# Patient Record
(demographics unavailable — no encounter records)

---

## 2017-04-14 NOTE — EDM.PDOC
ED HPI Allergic Reaction





- General


Chief Complaint: Allergic Reaction


Stated Complaint: PT HAS ALLERGIC REACTION, 22WKS PREGNANT


Time Seen by Provider: 17 20:03





- History of Present Illness


INITIAL COMMENTS - FREE TEXT/NARRATIVE: 





HISTORY AND PHYSICAL:





History of present illness:


The patient is a 19-year-old female was a  one para zero who is 22 weeks 

pregnant and follows with an OB in [place] and presents with symptoms of an 

allergic reaction after eating garlic at dinner at 7 PM, one hour ago. 

According to the patient she has not had any vaginal bleeding fevers chills 

coughing vomiting or diarrhea and was in her usual state of good health when 

they went to the restaurant and ate dinner where there was a lot of garlic. The 

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 

amounts" without having a reaction. The meal that she had had more garlic than 

she had anticipated. She presents with feeling somewhat short of breath and 

feeling overall internally agitated but she is not having any itching or rash 

which is classic for her reaction. She's not having  any facial swelling 

difficulty speaking or swallowing. She says that what she is experiencing is 

her symptomatology for an allergic reaction but it is just less severe than 

usual. She did take Benadryl 50 mg by mouth prior to coming here. She is 

complaining of some intermittent abdominal pain "Ravalli Nicole" that she says 

she gets periodically every week but is experiencing that currently. The baby 

is moving.





Review of systems: 


As per history of present illness and below otherwise all systems reviewed and 

negative.





Past medical history: 


As per history of present illness and as reviewed below otherwise 

noncontributory.





Surgical history: 


As per history of present illness and as reviewed below otherwise 

noncontributory.





Social history: 


No reported history of drug or alcohol abuse.





Family history: 


As per history of present illness and as reviewed below otherwise 

noncontributory.





Physical exam:


General: A well-developed well-nourished thin female who is nontoxic and 

speaking clearly in the ED without breathlessness. Her medicines have been 

noted by me.


HEENT: Atraumatic, normocephalic, pupils reactive, negative for conjunctival 

pallor or scleral icterus, mucous membranes moist, throat clear, neck supple, 

nontender, trachea midline. There is no lip tongue or oropharyngeal swelling 

and no facial swelling is appreciated


Lungs: Clear to auscultation, breath sounds equal bilaterally, chest nontender. 

No sensory muscle use work of breathing stridor or wheezing


Heart: S1S2, regular, negative for clicks, rubs, or JVD.


Abdomen: Soft, nondistended, nontender. Gravid uterus which is nontender 

Negative for masses or hepatosplenomegaly.


Skin: No evidence of any rashes or urticaria or lesions and turgor is normal


Genitourinary: Deferred.


Rectal: Deferred.


Extremities: Atraumatic, negative for cords or calf pain. Neurovascular 

unremarkable.


Neuro: Awake, alert, oriented. Cranial nerves II through XII unremarkable. 

Cerebellum unremarkable. Motor and sensory unremarkable throughout. Exam 

nonfocal.





Diagnostics:


[]





Therapeutics:


Patient took Benadryl prior to arrival, IV fluids Pepcid Solu-Medrol





Labor and delivery were contacted by our nurse supervisor at ; they state 

that once we're done treating her to send her up to labor and delivery for 

evaluation of her abdominal cramping





: Patient is feeling improved so I will transfer the patient to labor and 

delivery. I have advised taking Benadryl every 6 hours for the next 24 hours 

but I do not feel that her symptoms are significant enough for her classic 

presentation to put her on home prednisone. She is comfortable with this care 

plan. Advised on reasons to return to the ER if needed.





Impression: 


Allergic reaction mild stable ; second trimester pregnancy with episodic 

abdominal pain to go to labor and delivery for evaluation





Definitive disposition and diagnosis as appropriate pending reevaluation and 

review of above.





- Related Data


Allergies/ADRs: 


 Allergies











Allergy/AdvReac Type Severity Reaction Status Date / Time


 


acetaminophen [From Vicodin] Allergy  Nausea Verified 17 19:57


 


budesonide Allergy  Numbness Verified 17 19:57


 


garlic Allergy  Shortness Verified 17 19:57





   of Breath  


 


hydrocodone [From Vicodin] Allergy  Nausea Verified 17 19:57


 


Sulfa (Sulfonamide Allergy  Rash Verified 17 19:57





Antibiotics)     











Home Meds: 


 Home Meds





FLUoxetine HCl [Fluoxetine] 20 mg PO DAILY 17 [History]


Multivitamin [Multi-Vitamin Daily] 1 tab PO DAILY 17 [History]


Ondansetron HCl [Zofran] 4 mg PO ASDIRECTED 17 [History]











ED ROS ALLERGIC REACTION





- Review of Systems


Review Of Systems: ROS reveals no pertinent complaints other than HPI.





ED EXAM GENERAL NO PERIP PULSE





- Physical Exam


Exam: See Below (See dictation)





Course





- Vital Signs


Last Recorded V/S: 


 Last Vital Signs











Temp  36.6 C   17 19:59


 


Pulse  71   17 19:59


 


Resp  18   17 19:59


 


BP  119/64   17 19:59


 


Pulse Ox  100   17 19:59














- Orders/Labs/Meds


Orders: 


 Active Orders 24 hr











 Category Date Time Status


 


 Sodium Chloride 0.9% [Normal Saline] 1,000 ml Med  17 20:08 Active





 IV STAT   


 


 Sodium Chloride 0.9% [Saline Flush] Med  17 20:08 Active





 10 ml FLUSH ASDIRECTED PRN   


 


 Sodium Chloride 0.9% [Saline Flush] Med  17 20:08 Active





 2.5 ml FLUSH ASDIRECTED PRN   


 


 Saline Lock Insert [OM.PC] Stat Oth  17 20:08 Ordered








 Medication Orders





Sodium Chloride (Normal Saline)  1,000 mls @ 999 mls/hr IV STAT ONE


   Stop: 17 21:08


   Last Admin: 17 20:28  Dose: 999 mls/hr


Sodium Chloride (Saline Flush)  10 ml FLUSH ASDIRECTED PRN


   PRN Reason: Keep Vein Open


Sodium Chloride (Saline Flush)  2.5 ml FLUSH ASDIRECTED PRN


   PRN Reason: Keep Vein Open








Meds: 


Medications











Generic Name Dose Route Start Last Admin





  Trade Name Freq  PRN Reason Stop Dose Admin


 


Sodium Chloride  1,000 mls @ 999 mls/hr  17 20:08  17 20:28





  Normal Saline  IV  17 21:08  999 mls/hr





  STAT ONE   Administration


 


Sodium Chloride  10 ml  17 20:08  





  Saline Flush  FLUSH   





  ASDIRECTED PRN   





  Keep Vein Open   


 


Sodium Chloride  2.5 ml  17 20:08  





  Saline Flush  FLUSH   





  ASDIRECTED PRN   





  Keep Vein Open   














Discontinued Medications














Generic Name Dose Route Start Last Admin





  Trade Name Freq  PRN Reason Stop Dose Admin


 


Famotidine  20 mg  17 20:08  17 20:30





  Pepcid  IVPUSH  17 20:09  20 mg





  ONETIME ONE   Administration


 


Methylprednisolone Sodium Succinate  125 mg  17 20:08  17 20:29





  Solu-Medrol  IVPUSH  17 20:09  125 mg





  ONETIME ONE   Administration














Departure





- Departure


Time of Disposition: 21:00


Disposition: DC/Tfer to Other 70


Condition: good


Clinical Impression: 


Allergic reaction


Qualifiers:


 Encounter type: initial encounter Qualified Code(s): T78.40XA - Allergy, 

unspecified, initial encounter





Abdominal pain during pregnancy


Qualifiers:


 Trimester: second trimester Qualified Code(s): O26.892 - Other specified 

pregnancy related conditions, second trimester





Referrals: 


PCP,None [Primary Care Provider] - 





- My Orders


Last 24 Hours: 


My Active Orders





17 20:08


Sodium Chloride 0.9% [Normal Saline] 1,000 ml IV STAT 


Sodium Chloride 0.9% [Saline Flush]   10 ml FLUSH ASDIRECTED PRN 


Sodium Chloride 0.9% [Saline Flush]   2.5 ml FLUSH ASDIRECTED PRN 


Saline Lock Insert [OM.PC] Stat 














- Assessment/Plan


Last 24 Hours: 


My Active Orders





17 20:08


Sodium Chloride 0.9% [Normal Saline] 1,000 ml IV STAT 


Sodium Chloride 0.9% [Saline Flush]   10 ml FLUSH ASDIRECTED PRN 


Sodium Chloride 0.9% [Saline Flush]   2.5 ml FLUSH ASDIRECTED PRN 


Saline Lock Insert [OM.PC] Stat

## 2017-11-14 NOTE — EDM.PDOC
ED HPI GENERAL MEDICAL PROBLEM





- General


Chief Complaint: Upper Extremity Injury/Pain


Stated Complaint: PT HURT LT SHOULDER


Time Seen by Provider: 11/14/17 19:58


Source of Information: Reports: Patient


History Limitations: Reports: No Limitations





- History of Present Illness


INITIAL COMMENTS - FREE TEXT/NARRATIVE: 


History of present illness:


[20-year-old female comes in complaining of left shoulder pain. Patient 

indicates that she was horsing around with her boyfriend and tripped over her 

dog and landed full at an acute angle causing severe pain in her left shoulder 

she apparently tried ice and heat to no avail so she came in to be evaluated.]





Review of systems: 


As per history of present illness and below otherwise all systems reviewed and 

negative.





Past medical history: 


As per history of present illness and as reviewed below otherwise 

noncontributory.





Surgical history: 


As per history of present illness and as reviewed below otherwise 

noncontributory.





Social history: 


No reported history of drug or alcohol abuse.





Family history: 


As per history of present illness and as reviewed below otherwise 

noncontributory.





Physical exam:


HEENT: Atraumatic, normocephalic, pupils reactive, negative for conjunctival 

pallor or scleral icterus, mucous membranes moist, throat clear, neck supple, 

nontender, trachea midline.


Lungs: Clear to auscultation, breath sounds equal bilaterally, chest nontender.


Heart: S1S2, regular, negative for clicks, rubs, or JVD.


Abdomen: Soft, nondistended, nontender. Negative for masses or 

hepatosplenomegaly. Negative for costovertebral tenderness.


Pelvis: Stable nontender.


Genitourinary: Deferred.


Rectal: Deferred.


Extremities: Atraumatic, negative for cords or calf pain. Significant amount of 

guarding with left shoulder keeping arm tight to the side


Neuro: Awake, alert, oriented. Cranial nerves II through XII unremarkable. 

Cerebellum unremarkable. Motor and sensory unremarkable throughout. Exam 

nonfocal.








X-ray is negative for shoulder or rib bony abnormality





Diagnostics:


[X-ray of the left shoulder and left ribs]





Therapeutics:


[Toradol, Norflex]





Impression: 


[Contusion]





Plan:


[Over-the-counter and sent]





Definitive disposition and diagnosis as appropriate pending reevaluation and 

review of above.











- Related Data


 Allergies











Allergy/AdvReac Type Severity Reaction Status Date / Time


 


acetaminophen [From Vicodin] Allergy  Nausea Verified 11/14/17 20:59


 


budesonide Allergy  Numbness Verified 11/14/17 20:59


 


garlic Allergy  Shortness Verified 11/14/17 20:59





   of Breath  


 


hydrocodone [From Vicodin] Allergy  Nausea Verified 11/14/17 20:59


 


Sulfa (Sulfonamide Allergy  Rash Verified 11/14/17 20:59





Antibiotics)     











Home Meds: 


 Home Meds





Multivitamin [Multi-Vitamin Daily] 1 tab PO DAILY 04/14/17 [History]











Past Medical History


HEENT History: Reports: None


Cardiovascular History: Reports: None


Respiratory History: Reports: None


Gastrointestinal History: Reports: Other (See Below)


Other Gastrointestinal History: chron's


Genitourinary History: Reports: None


OB/GYN History: Reports: Pregnancy


Musculoskeletal History: Reports: None


Neurological History: Reports: None


Psychiatric History: Reports: Anxiety, OCD


Endocrine/Metabolic History: Reports: None


Hematologic History: Reports: None


Oncologic (Cancer) History: Reports: None


Dermatologic History: Reports: None





- Infectious Disease History


Infectious Disease History: Reports: None





Social & Family History





- Family History


Family Medical History: Noncontributory





- Tobacco Use


Smoking Status *Q: Never Smoker





- Caffeine Use


Caffeine Use: Reports: Soda





- Recreational Drug Use


Recreational Drug Use: No





Review of Systems





- Review of Systems


Review Of Systems: See Below (See history of present illness)





ED EXAM, GENERAL





- Physical Exam


Exam: See Below (History of present illness)





Course





- Orders/Labs/Meds


Orders: 


 Active Orders 24 hr











 Category Date Time Status


 


 Ribs 2V w Chest Lt [CR] Stat Exams  11/14/17 20:34 Taken


 


 Shoulder Comp Lt [CR] Stat Exams  11/14/17 19:58 Taken














Departure





- Departure


Time of Disposition: 21:03


Disposition: Home, Self-Care 01


Condition: Good


Clinical Impression: 


 Contusion








- Discharge Information


Referrals: 


PCP,None [Primary Care Provider] - 


Forms:  ED Department Discharge


Additional Instructions: 


The following information is given to patients seen in the emergency department 

who are being discharged to home. This information is to outline your options 

for follow-up care. We provide all patients seen in our emergency department 

with a follow-up referral.





The need for follow-up, as well as the timing and circumstances, are variable 

depending upon the specifics of your emergency department visit.





If you don't have a primary care physician on staff, we will provide you with a 

referral. We always advise you to contact your personal physician following an 

emergency department visit to inform them of the circumstance of the visit and 

for follow-up with them and/or the need for any referrals to a consulting 

specialist.





The emergency department will also refer you to a specialist when appropriate. 

This referral assures that you have the opportunity for follow-up care with a 

specialist. All of these measure are taken in an effort to provide you with 

optimal care, which includes your follow-up.





Under all circumstances we always encourage you to contact your private 

physician who remains a resource for coordinating your care. When calling for 

follow-up care, please make the office aware that this follow-up is from your 

recent emergency room visit. If for any reason you are refused follow-up, 

please contact the [facility] Emergency 

Department at [phone] and asked to speak to the emergency department 

charge nurse.





Your shoulder and ribs are negative for fracture at this time the pain you're 

having would be consistent then with a contusion








You may take over-the-counter pain medicine such as ibuprofen or Aleve as well 

as alternating ice and heat for 20 minutes a time to help you with your 

discomfort











Follow-up with primary care provider one to 2 days











Return to ED as needed as discussed





- My Orders


Last 24 Hours: 


My Active Orders





11/14/17 19:58


Shoulder Comp Lt [CR] Stat 














- Assessment/Plan


Last 24 Hours: 


My Active Orders





11/14/17 19:58


Shoulder Comp Lt [CR] Stat

## 2017-11-15 NOTE — CR
EXAM DATE: 17



PATIENT'S AGE: 20





Patient: PERICO KEENAN



Facility: [place], [place]

Patient ID: [id number]

Site Patient ID: [id number].

Site Accession #: [id number]

: 1997

Study: XRay Shoulder [id number]/14/2017 8:52:14 PM

Ordering Physician: Doctor Temp



Final Report: 

INDICATION: fall



TECHNIQUE:

Three views of the left shoulder



COMPARISON:

None 



FINDINGS:

Bones: No fractures or bone lesions. 

Joint spaces: Unremarkable. 

Soft tissues: Unremarkable. 



IMPRESSION:

No acute bony abnormality



Dictated by Angelito Briones MD @ 2017 8:57:34 PM





Dictated by: Angelito Briones MD @ 2017 20:57:40

(Electronic Signature)





Report Signed by Proxy.
[facility]

## 2017-11-15 NOTE — CR
EXAM DATE: 17



PATIENT'S AGE: 20





Patient: PERICO KEENAN



Facility: [place], [place]

Patient ID: [id number]

Site Patient ID: [id number].

Site Accession #: [id number].

: 1997

Study: XRay Chest ribs w/ chest [id number]/14/2017 8:51:58 PM

Ordering Physician: Doctor Temp



Final Report: 

INDICATION: fall



TECHNIQUE:

Chest 1 view and left rib series 



COMPARISON:

None 



FINDINGS:

Cardiovascular and mediastinum: Heart size and vasculature are normal in 
caliber and appearance. Mediastinum is within normal limits. 

Lungs and pleural space: No focal consolidation. No sign of pleural effusion. 
No pneumothorax. 

Bones and soft tissues: No significant findings. 



IMPRESSION:

No acute cardiopulmonary disease or left-sided rib abnormality



Dictated by Angelito Briones MD @ 2017 8:56:50 PM





Dictated by: Angelito Briones MD @ 2017 20:57:00

(Electronic Signature)



Report Signed by Proxy.
ALEXA

## 2018-01-23 NOTE — EDM.PDOC
ED HPI GENERAL MEDICAL PROBLEM





- General


Chief Complaint: Abdominal Pain


Stated Complaint: RIGHT SIDE ABDOMINAL AND LOWER BACK PAIN


Time Seen by Provider: 01/23/18 15:24


Source of Information: Reports: Patient


History Limitations: Reports: No Limitations





- History of Present Illness


INITIAL COMMENTS - FREE TEXT/NARRATIVE: 





HISTORY AND PHYSICAL:


[]20-year-old  female presenting with concerns over right lower 

quadrant pain





History of Present Illness:


[]Patient's had this pain for at least a month off and on she has an IUD


Her nurse midwife probably a cyst


Patient relates history of Crohn's disease treated with steroids in the past


There is history grandmother with ovarian cancer





Review of Systems:


As per history of present illness and below otherwise all 


systems reviewed and negative.  





Past medical history:


As per history of present illness and as reviewed below


otherwise noncontributory.





Surgical history:


As per history of present illness and as reviewed below


otherwise noncontributory.





Social history:


No reported history of drug or alcohol abuse.





Family history:


As per history of present illness and as reviewed below


otherwise noncontributory.





Physical exam:


Alert and oriented female answering questions in full sentences without any 

shortness of breath denies need for pain medication at this time rates her pain 

4/10.


HEENT: Atraumatic, normocehpalic, pupils reactive, negative for conjunctival 

pallor or scleral icterus, mucous membranes moist, throat clear, neck supple, 

nontender, trachea midline.  


Lungs: Clear to auscultation, breath sounds equal bilaterally, chest non 

tender.  


Heart: S1S2, regular, negative for clicks, rubs, or JVD.


Abdomen: Soft, nondistended, mildly tender. No rebound and no guarding. 

Negative for masses or hepatossplenmegaly. Negative for costovertebral 

tenderness.


Pelvis: Stable nontender.


Genitourinary: Deferred.


Rectal: Deferred


Extremities: Atraumatic, negative for cords or calf pain.  


Neurovascular unremarkable.


Neuro:  Awake, alert, oriented.  Cranial nerves II through XII


unremarkable.  Cerebellum unremarkable.  Motor and sensory unremarkable 

throughout.  Exam nonfocal.  


Test results are unremarkable with suggests that this is a inflammation with 

her Crohn's disease





Diagnostics:


[cbc


cmp


ua


us abd/pelvis non-ob]





Therapeutics:


[]





Impression:


[Abdominal pain likely Crohn's disease]





Plan:


[]Discharged to home


Follow up with your primary care provider


Adrenal dose pack by prescription





Definitive disposition and diagnosis as appropriate pending


reevaluation and review of above.  





Onset: Gradual


Duration: Week(s):


Location: Reports: Abdomen


  ** Right lower abdomen


Pain Score (Numeric/FACES): 3





- Related Data


 Allergies











Allergy/AdvReac Type Severity Reaction Status Date / Time


 


acetaminophen [From Vicodin] Allergy  Nausea Verified 01/23/18 15:12


 


budesonide Allergy  Numbness Verified 01/23/18 15:12


 


garlic Allergy  Shortness Verified 01/23/18 15:12





   of Breath  


 


hydrocodone [From Vicodin] Allergy  Nausea Verified 01/23/18 15:12


 


Sulfa (Sulfonamide Allergy  Rash Verified 01/23/18 15:12





Antibiotics)     


 


pine trees Allergy  Hives Uncoded 01/23/18 15:12











Home Meds: 


 Home Meds





buPROPion [Wellbutrin] 25 mg PO DAILY 01/23/18 [History]











Past Medical History


HEENT History: Reports: None


Cardiovascular History: Reports: None


Respiratory History: Reports: Asthma


Gastrointestinal History: Reports: Other (See Below)


Other Gastrointestinal History: chron's


Genitourinary History: Reports: None


OB/GYN History: Reports: Pregnancy


Musculoskeletal History: Reports: None


Neurological History: Reports: None


Psychiatric History: Reports: Anxiety, Depression, OCD


Endocrine/Metabolic History: Reports: None


Hematologic History: Reports: None


Oncologic (Cancer) History: Reports: None


Dermatologic History: Reports: None





- Infectious Disease History


Infectious Disease History: Reports: None





Social & Family History





- Family History


Family Medical History: Noncontributory


Other Dermatologic Family History: ovarian CA





- Tobacco Use


Smoking Status *Q: Never Smoker


Years of Tobacco use: 4


Packs/Tins Daily: 1


Second Hand Smoke Exposure: No





- Caffeine Use


Caffeine Use: Reports: Soda, Tea





- Recreational Drug Use


Recreational Drug Use: Yes


Recreational Drug Type: Reports: Marijuana/Hashish


Recreational Drug Use Frequency: Weekly





ED ROS GENERAL





- Review of Systems


Review Of Systems: ROS reveals no pertinent complaints other than HPI.





ED EXAM, RENAL/





- Physical Exam


Exam: See Below (see dictation)





Course





- Vital Signs


Last Recorded V/S: 


 Last Vital Signs











Temp  36.6 C   01/23/18 15:09


 


Pulse  110 H  01/23/18 15:09


 


Resp  18   01/23/18 15:09


 


BP  119/68   01/23/18 15:09


 


Pulse Ox  100   01/23/18 15:09














- Orders/Labs/Meds


Orders: 


 Active Orders 24 hr











 Category Date Time Status


 


 Sodium Chloride 0.9% [Saline Flush] Med  01/23/18 15:22 Active





 10 ml FLUSH ASDIRECTED PRN   


 


 Sodium Chloride 0.9% [Saline Flush] Med  01/23/18 15:22 Active





 2.5 ml FLUSH ASDIRECTED PRN   


 


 Saline Lock Insert [OM.PC] Stat Oth  01/23/18 15:22 Ordered








 Medication Orders





Sodium Chloride (Saline Flush)  10 ml FLUSH ASDIRECTED PRN


   PRN Reason: Keep Vein Open


Sodium Chloride (Saline Flush)  2.5 ml FLUSH ASDIRECTED PRN


   PRN Reason: Keep Vein Open








Labs: 


 Laboratory Tests











  01/23/18 01/23/18 01/23/18 Range/Units





  15:35 15:35 15:35 


 


WBC  9.60    (4.0-11.0)  K/uL


 


RBC  4.14 L    (4.30-5.90)  M/uL


 


Hgb  12.3    (12.0-16.0)  g/dL


 


Hct  37.0    (36.0-46.0)  %


 


MCV  89.4    (80.0-98.0)  fL


 


MCH  29.7    (27.0-32.0)  pg


 


MCHC  33.2    (31.0-37.0)  g/dL


 


RDW Std Deviation  43.6    (28.0-62.0)  fl


 


RDW Coeff of Fatoumata  13    (11.0-15.0)  %


 


Plt Count  339    (150-400)  K/uL


 


MPV  11.60    (7.40-12.00)  fL


 


Neut % (Auto)  63.6    (48.0-80.0)  %


 


Lymph % (Auto)  26.0    (16.0-40.0)  %


 


Mono % (Auto)  6.8    (0.0-15.0)  %


 


Eos % (Auto)  3.0    (0.0-7.0)  %


 


Baso % (Auto)  0.6    (0.0-1.5)  %


 


Neut # (Auto)  6.1 H    (1.4-5.7)  K/uL


 


Lymph # (Auto)  2.5 H    (0.6-2.4)  K/uL


 


Mono # (Auto)  0.7    (0.0-0.8)  K/uL


 


Eos # (Auto)  0.3    (0.0-0.7)  K/uL


 


Baso # (Auto)  0.1    (0.0-0.1)  K/uL


 


Nucleated RBC %  0.0    /100WBC


 


Nucleated RBCs #  0    K/uL


 


Sodium   142   (136-146)  mmol/L


 


Potassium   3.7   (3.5-5.1)  mmol/L


 


Chloride   107   ()  mmol/L


 


Carbon Dioxide   25   (21-31)  mmol/L


 


BUN   7   (6.0-23.0)  mg/dL


 


Creatinine   0.7   (0.6-1.5)  mg/dL


 


Est Cr Clr Drug Dosing   106.05   mL/min


 


Estimated GFR (MDRD)   > 60.0   ml/min


 


Glucose   92   ()  mg/dL


 


Calcium   9.0   (8.8-10.8)  mg/dL


 


Total Bilirubin   0.4   (0.1-1.5)  mg/dL


 


AST   12   (5-40)  IU/L


 


ALT   9   (8-54)  IU/L


 


Alkaline Phosphatase   91   ()  


 


Total Protein   6.9   (6.0-8.0)  g/dL


 


Albumin   4.0   (3.5-5.0)  g/dL


 


Globulin   2.9   (2.0-3.5)  g/dL


 


Albumin/Globulin Ratio   1.4   (1.3-2.8)  


 


Urine Color    YELLOW  


 


Urine Appearance    CLEAR  


 


Urine pH    8.0  (5.0-8.0)  


 


Ur Specific Gravity    1.010  (1.001-1.035)  


 


Urine Protein    NEGATIVE  (NEGATIVE)  mg/dL


 


Urine Glucose (UA)    NEGATIVE  (NEGATIVE)  mg/dL


 


Urine Ketones    NEGATIVE  (NEGATIVE)  mg/dL


 


Urine Occult Blood    NEGATIVE  (NEGATIVE)  


 


Urine Nitrite    NEGATIVE  (NEGATIVE)  


 


Urine Bilirubin    NEGATIVE  (NEGATIVE)  


 


Urine Urobilinogen    0.2  (<2.0)  EU/dL


 


Ur Leukocyte Esterase    NEGATIVE  (NEGATIVE)  


 


Urine RBC    0-1  (0-2/HPF)  


 


Urine WBC    0-1  (0-5/HPF)  


 


Ur Epithelial Cells    OCCASIONAL  (NONE-FEW)  


 


Urine Bacteria    RARE  (NEGATIVE)  











Meds: 


Medications











Generic Name Dose Route Start Last Admin





  Trade Name Freq  PRN Reason Stop Dose Admin


 


Sodium Chloride  10 ml  01/23/18 15:22  





  Saline Flush  FLUSH   





  ASDIRECTED PRN   





  Keep Vein Open   


 


Sodium Chloride  2.5 ml  01/23/18 15:22  





  Saline Flush  FLUSH   





  ASDIRECTED PRN   





  Keep Vein Open   














Departure





- Departure


Time of Disposition: 16:50


Disposition: Home, Self-Care 01


Condition: Good


Clinical Impression: 


Abdominal pain


Qualifiers:


 Abdominal location: right lower quadrant Qualified Code(s): R10.31 - Right 

lower quadrant pain








- Discharge Information


Referrals: 


PCP,None [Primary Care Provider] - 


Forms:  ED Department Discharge





- My Orders


Last 24 Hours: 


My Active Orders





01/23/18 15:22


Sodium Chloride 0.9% [Saline Flush]   10 ml FLUSH ASDIRECTED PRN 


Sodium Chloride 0.9% [Saline Flush]   2.5 ml FLUSH ASDIRECTED PRN 


Saline Lock Insert [OM.PC] Stat 














- Assessment/Plan


Last 24 Hours: 


My Active Orders





01/23/18 15:22


Sodium Chloride 0.9% [Saline Flush]   10 ml FLUSH ASDIRECTED PRN 


Sodium Chloride 0.9% [Saline Flush]   2.5 ml FLUSH ASDIRECTED PRN 


Saline Lock Insert [OM.PC] Stat

## 2018-01-23 NOTE — US
EXAMINATION: Transabdominal pelvic ultrasound

 

HISTORY: Pain

 

COMPARISON: None

 

TECHNIQUE: Grayscale, color Doppler, and spectral Doppler imaging obtained transabdominally.

 

FINDINGS: The uterus is normal in size, contour, and echogenicity with an IUD noted. Endometrial stri
pe otherwise measures 5 mm. Trace physiologic free fluid.

 

Both the left and right ovaries are normal size, contour, and echogenicity demonstrating normal color
 and spectral Doppler flow. Simple 2.9 cm left ovarian cyst is noted.

 

IMPRESSION: 

1. Grossly unremarkable pelvic ultrasound.

2. IUD noted within the uterus in place.

## 2018-02-17 NOTE — EDM.PDOC
ED HPI GENERAL MEDICAL PROBLEM





- General


Chief Complaint: Abdominal Pain


Stated Complaint: CHROMES DISEASE FLARE UP


Time Seen by Provider: 02/17/18 21:32





- History of Present Illness


INITIAL COMMENTS - FREE TEXT/NARRATIVE: 


HISTORY AND PHYSICAL:





History of present illness:


Patient's 20-year-old female history of Crohn's disease presents with a concern 

of abdominal pain she states this is consistent with her prior Crohn's 

exacerbations that she has been relatively asymptomatic during her recent past 

pregnancy. She's had nausea and vomiting with abdominal pain she denies fever 

chills diarrhea or other complaints at this point. She denies urinary symptoms 

vaginal discharge or irregular bleeding





Review of systems: 


As per history of present illness and below otherwise all systems reviewed and 

negative.





Past medical history: 


As per history of present illness and as reviewed below otherwise 

noncontributory.





Surgical history: 


As per history of present illness and as reviewed below otherwise 

noncontributory.





Social history: 


No reported history of drug or alcohol abuse.





Family history: 


As per history of present illness and as reviewed below otherwise 

noncontributory.





Physical exam:


HEENT: Atraumatic, normocephalic, pupils reactive, negative for conjunctival 

pallor or scleral icterus, mucous membranes moist, throat clear, neck supple, 

nontender, trachea midline.


Lungs: Clear to auscultation, breath sounds equal bilaterally, chest nontender.


Heart: S1S2, regular, negative for clicks, rubs, or JVD.


Abdomen: Soft, nondistended, no localized tenderness. Negative for masses or 

hepatosplenomegaly. Negative for costovertebral tenderness.


Pelvis: Stable nontender.


Genitourinary: Deferred.


Rectal: Deferred.


Extremities: Atraumatic, negative for cords or calf pain. Neurovascular 

unremarkable.


Neuro: Awake, alert, oriented. Cranial nerves II through XII unremarkable. 

Cerebellum unremarkable. Motor and sensory unremarkable throughout. Exam 

nonfocal.





Diagnostics:


CBC CMP UA hCG CRP CT abdomen and pelvis





Therapeutics:


Normal saline 1 L bolus Zofran 4 mg IV when necessary





Impression: 


#1 abdominal pain #2 history of Crohn's disease





Definitive disposition and diagnosis as appropriate pending reevaluation and 

review of above.





  ** Abdominal


Pain Score (Numeric/FACES): 5





- Related Data


 Allergies











Allergy/AdvReac Type Severity Reaction Status Date / Time


 


acetaminophen [From Vicodin] Allergy  Nausea Verified 01/23/18 15:12


 


budesonide Allergy  Numbness Verified 01/23/18 15:12


 


garlic Allergy  Shortness Verified 01/23/18 15:12





   of Breath  


 


hydrocodone [From Vicodin] Allergy  Nausea Verified 01/23/18 15:12


 


Sulfa (Sulfonamide Allergy  Rash Verified 01/23/18 15:12





Antibiotics)     


 


pine trees Allergy  Hives Uncoded 01/23/18 15:12











Home Meds: 


 Home Meds





buPROPion [Wellbutrin] 25 mg PO DAILY 01/23/18 [History]











Past Medical History


HEENT History: Reports: None


Cardiovascular History: Reports: None


Respiratory History: Reports: Asthma


Gastrointestinal History: Reports: Other (See Below)


Other Gastrointestinal History: chron's


Genitourinary History: Reports: None


OB/GYN History: Reports: Pregnancy


Musculoskeletal History: Reports: None


Neurological History: Reports: None


Psychiatric History: Reports: Anxiety, Depression, OCD


Endocrine/Metabolic History: Reports: None


Hematologic History: Reports: None


Oncologic (Cancer) History: Reports: None


Dermatologic History: Reports: None





- Infectious Disease History


Infectious Disease History: Reports: None





Social & Family History





- Family History


Family Medical History: Noncontributory


Other Dermatologic Family History: ovarian CA





- Tobacco Use


Smoking Status *Q: Never Smoker


Years of Tobacco use: 4


Packs/Tins Daily: 1


Second Hand Smoke Exposure: No





- Caffeine Use


Caffeine Use: Reports: Soda, Tea





- Recreational Drug Use


Recreational Drug Use: Yes


Recreational Drug Type: Reports: Marijuana/Hashish


Recreational Drug Use Frequency: Weekly





ED ROS GENERAL





- Review of Systems


Review Of Systems: ROS reveals no pertinent complaints other than HPI.





ED EXAM, GENERAL





- Physical Exam


Exam: See Below (See dictation)





Course





- Vital Signs


Last Recorded V/S: 


 Last Vital Signs











Temp  37.4 C   02/17/18 21:24


 


Pulse  105 H  02/17/18 21:24


 


Resp  16   02/17/18 21:24


 


BP  128/81   02/17/18 21:24


 


Pulse Ox  100   02/17/18 21:24














- Orders/Labs/Meds


Orders: 


 Active Orders 24 hr











 Category Date Time Status


 


 Abdomen Pelvis wo Cont [CT] Stat Exams  02/17/18 21:37 Taken











Labs: 


 Laboratory Tests











  02/17/18 02/17/18 02/17/18 Range/Units





  21:40 21:40 22:10 


 


WBC  14.36 H    (4.0-11.0)  K/uL


 


RBC  4.40    (4.30-5.90)  M/uL


 


Hgb  13.0    (12.0-16.0)  g/dL


 


Hct  38.5    (36.0-46.0)  %


 


MCV  87.5    (80.0-98.0)  fL


 


MCH  29.5    (27.0-32.0)  pg


 


MCHC  33.8    (31.0-37.0)  g/dL


 


RDW Std Deviation  42.4    (28.0-62.0)  fl


 


RDW Coeff of Fatoumata  13    (11.0-15.0)  %


 


Plt Count  465 H    (150-400)  K/uL


 


MPV  11.10    (7.40-12.00)  fL


 


Neut % (Auto)  70.6    (48.0-80.0)  %


 


Lymph % (Auto)  19.0    (16.0-40.0)  %


 


Mono % (Auto)  8.1    (0.0-15.0)  %


 


Eos % (Auto)  2.0    (0.0-7.0)  %


 


Baso % (Auto)  0.3    (0.0-1.5)  %


 


Neut # (Auto)  10.1 H    (1.4-5.7)  K/uL


 


Lymph # (Auto)  2.7 H    (0.6-2.4)  K/uL


 


Mono # (Auto)  1.2 H    (0.0-0.8)  K/uL


 


Eos # (Auto)  0.3    (0.0-0.7)  K/uL


 


Baso # (Auto)  0.1    (0.0-0.1)  K/uL


 


Nucleated RBC %  0.0    /100WBC


 


Nucleated RBCs #  0    K/uL


 


Sodium   140   (136-146)  mmol/L


 


Potassium   3.8   (3.5-5.1)  mmol/L


 


Chloride   105   ()  mmol/L


 


Carbon Dioxide   25   (21-31)  mmol/L


 


BUN   11   (6.0-23.0)  mg/dL


 


Creatinine   0.7   (0.6-1.5)  mg/dL


 


Est Cr Clr Drug Dosing   106.05   mL/min


 


Estimated GFR (MDRD)   > 60.0   ml/min


 


Glucose   99   ()  mg/dL


 


Calcium   9.3   (8.8-10.8)  mg/dL


 


Total Bilirubin   0.4   (0.1-1.5)  mg/dL


 


AST   11   (5-40)  IU/L


 


ALT   9   (8-54)  IU/L


 


Alkaline Phosphatase   104   ()  


 


C-Reactive Protein   3.75 H   (0.0-0.5)  mg/dL


 


Total Protein   7.1   (6.0-8.0)  g/dL


 


Albumin   4.1   (3.5-5.0)  g/dL


 


Globulin   3.0   (2.0-3.5)  g/dL


 


Albumin/Globulin Ratio   1.4   (1.3-2.8)  


 


Lipase   14   (7-80)  U/L


 


Urine Color    YELLOW  


 


Urine Appearance    CLEAR  


 


Urine pH    6.0  (5.0-8.0)  


 


Ur Specific Gravity    1.020  (1.001-1.035)  


 


Urine Protein    NEGATIVE  (NEGATIVE)  mg/dL


 


Urine Glucose (UA)    NEGATIVE  (NEGATIVE)  mg/dL


 


Urine Ketones    NEGATIVE  (NEGATIVE)  mg/dL


 


Urine Occult Blood    NEGATIVE  (NEGATIVE)  


 


Urine Nitrite    NEGATIVE  (NEGATIVE)  


 


Urine Bilirubin    NEGATIVE  (NEGATIVE)  


 


Urine Urobilinogen    1.0  (<2.0)  EU/dL


 


Ur Leukocyte Esterase    NEGATIVE  (NEGATIVE)  


 


Urine RBC    0-2  (0-2/HPF)  


 


Urine WBC    1-3  (0-5/HPF)  


 


Ur Epithelial Cells    FEW  (NONE-FEW)  


 


Amorphous Sediment    FEW  (NEGATIVE)  


 


Urine Bacteria    FEW  (NEGATIVE)  


 


Urine Mucus    MODERATE  (NONE-MOD)  


 


Urine HCG, Qual     (NEGATIVE)  














  02/17/18 Range/Units





  22:16 


 


WBC   (4.0-11.0)  K/uL


 


RBC   (4.30-5.90)  M/uL


 


Hgb   (12.0-16.0)  g/dL


 


Hct   (36.0-46.0)  %


 


MCV   (80.0-98.0)  fL


 


MCH   (27.0-32.0)  pg


 


MCHC   (31.0-37.0)  g/dL


 


RDW Std Deviation   (28.0-62.0)  fl


 


RDW Coeff of Fatoumata   (11.0-15.0)  %


 


Plt Count   (150-400)  K/uL


 


MPV   (7.40-12.00)  fL


 


Neut % (Auto)   (48.0-80.0)  %


 


Lymph % (Auto)   (16.0-40.0)  %


 


Mono % (Auto)   (0.0-15.0)  %


 


Eos % (Auto)   (0.0-7.0)  %


 


Baso % (Auto)   (0.0-1.5)  %


 


Neut # (Auto)   (1.4-5.7)  K/uL


 


Lymph # (Auto)   (0.6-2.4)  K/uL


 


Mono # (Auto)   (0.0-0.8)  K/uL


 


Eos # (Auto)   (0.0-0.7)  K/uL


 


Baso # (Auto)   (0.0-0.1)  K/uL


 


Nucleated RBC %   /100WBC


 


Nucleated RBCs #   K/uL


 


Sodium   (136-146)  mmol/L


 


Potassium   (3.5-5.1)  mmol/L


 


Chloride   ()  mmol/L


 


Carbon Dioxide   (21-31)  mmol/L


 


BUN   (6.0-23.0)  mg/dL


 


Creatinine   (0.6-1.5)  mg/dL


 


Est Cr Clr Drug Dosing   mL/min


 


Estimated GFR (MDRD)   ml/min


 


Glucose   ()  mg/dL


 


Calcium   (8.8-10.8)  mg/dL


 


Total Bilirubin   (0.1-1.5)  mg/dL


 


AST   (5-40)  IU/L


 


ALT   (8-54)  IU/L


 


Alkaline Phosphatase   ()  


 


C-Reactive Protein   (0.0-0.5)  mg/dL


 


Total Protein   (6.0-8.0)  g/dL


 


Albumin   (3.5-5.0)  g/dL


 


Globulin   (2.0-3.5)  g/dL


 


Albumin/Globulin Ratio   (1.3-2.8)  


 


Lipase   (7-80)  U/L


 


Urine Color   


 


Urine Appearance   


 


Urine pH   (5.0-8.0)  


 


Ur Specific Gravity   (1.001-1.035)  


 


Urine Protein   (NEGATIVE)  mg/dL


 


Urine Glucose (UA)   (NEGATIVE)  mg/dL


 


Urine Ketones   (NEGATIVE)  mg/dL


 


Urine Occult Blood   (NEGATIVE)  


 


Urine Nitrite   (NEGATIVE)  


 


Urine Bilirubin   (NEGATIVE)  


 


Urine Urobilinogen   (<2.0)  EU/dL


 


Ur Leukocyte Esterase   (NEGATIVE)  


 


Urine RBC   (0-2/HPF)  


 


Urine WBC   (0-5/HPF)  


 


Ur Epithelial Cells   (NONE-FEW)  


 


Amorphous Sediment   (NEGATIVE)  


 


Urine Bacteria   (NEGATIVE)  


 


Urine Mucus   (NONE-MOD)  


 


Urine HCG, Qual  NEGATIVE  (NEGATIVE)  











Meds: 


Medications














Discontinued Medications














Generic Name Dose Route Start Last Admin





  Trade Name Casey  PRN Reason Stop Dose Admin


 


Hydromorphone HCl  2 mg  02/17/18 23:13  02/17/18 23:20





  Dilaudid  IVPUSH  02/17/18 23:14  2 mg





  ONETIME ONE   Administration


 


Sodium Chloride  1,000 mls @ 999 mls/hr  02/17/18 21:38  02/17/18 21:46





  Normal Saline  IV  02/17/18 22:38  999 mls/hr





  STAT ONE   Administration


 


Ondansetron HCl  4 mg  02/17/18 23:15  02/17/18 23:20





  Zofran  IVPUSH  02/17/18 23:16  4 mg





  ONETIME ONE   Administration














Departure





- Departure


Time of Disposition: 23:39


Disposition: Refer to Observation


Condition: Good


Clinical Impression: 


 Exacerbation of Crohn's disease








- Discharge Information


Referrals: 


PCP,None [Primary Care Provider] - 


Forms:  ED Department Discharge





- My Orders


Last 24 Hours: 


My Active Orders





02/17/18 21:37


Abdomen Pelvis wo Cont [CT] Stat 














- Assessment/Plan


Last 24 Hours: 


My Active Orders





02/17/18 21:37


Abdomen Pelvis wo Cont [CT] Stat

## 2018-02-18 NOTE — PCM.HP
H&P History of Present Illness





- History of Present Illness


Initial Comments - Free Text/Narative: 





19 yo female with pmh of Crohn's disease who presents with four day history of 

abdominal pain and diarrhea.  Yesterday she started vomiting.  She is on no 

medications for her Crohn's disease and had not had a flair for over a year and 

a half.  She reports some fevers and chills.  She denies any shortness of 

breath or cough.  CT scan of abdomen reported terminal ileitis.


  ** Abdominal


Pain Score (Numeric/FACES): 8





- Related Data


Allergies/Adverse Reactions: 


 Allergies











Allergy/AdvReac Type Severity Reaction Status Date / Time


 


acetaminophen [From Vicodin] Allergy  Nausea Verified 01/23/18 15:12


 


budesonide Allergy  Numbness Verified 01/23/18 15:12


 


garlic Allergy  Shortness Verified 01/23/18 15:12





   of Breath  


 


hydrocodone [From Vicodin] Allergy  Nausea Verified 01/23/18 15:12


 


Sulfa (Sulfonamide Allergy  Rash Verified 01/23/18 15:12





Antibiotics)     


 


pine trees Allergy  Hives Uncoded 01/23/18 15:12











Home Medications: 


 Home Meds





buPROPion [Wellbutrin] 25 mg PO DAILY 01/23/18 [History]











Past Medical History





- Past Health History


Medical/Surgical History: Denies Medical/Surgical History


HEENT History: Reports: None


Cardiovascular History: Reports: None


Respiratory History: Reports: Asthma


Gastrointestinal History: Reports: Other (See Below)


Other Gastrointestinal History: chron's


Genitourinary History: Reports: None


OB/GYN History: Reports: Pregnancy


Musculoskeletal History: Reports: Back Pain, Chronic


Neurological History: Reports: None


Psychiatric History: Reports: Anxiety, Depression, OCD


Endocrine/Metabolic History: Reports: None


Hematologic History: Reports: None


Oncologic (Cancer) History: Reports: None


Dermatologic History: Reports: None





- Infectious Disease History


Infectious Disease History: Reports: None





- Past Surgical History


GI Surgical History: Reports: Colonoscopy, EGD





Social & Family History





- Family History


Family Medical History: Noncontributory


Other Dermatologic Family History: ovarian CA





- Tobacco Use


Smoking Status *Q: Former Smoker


Years of Tobacco use: 4


Packs/Tins Daily: 1


Used Tobacco, but Quit: Yes


Month Tobacco Last Used: November


Tobacco Use Comment: former smoker, last smoke Novmebr 2017


Second Hand Smoke Exposure: No





- Caffeine Use


Caffeine Use: Reports: Soda





- Recreational Drug Use


Recreational Drug Use: No


Recreational Drug Type: Reports: Marijuana/Hashish, Other (see below)


Other Recreational Drug Type: patient verbalized NCBD cream for her back that 

contains Marijuana


Recreational Drug Use Frequency: Weekly





H&P Review of Systems





- Review of Systems:


Review Of Systems: ROS reveals no pertinent complaints other than HPI.





Exam





- Exam


Exam: See Below





- Vital Signs


Vital Signs: 


 Last Vital Signs











Temp  36.7 C   02/18/18 08:00


 


Pulse  77   02/18/18 08:00


 


Resp  14   02/18/18 08:00


 


BP  123/79   02/18/18 08:00


 


Pulse Ox  99   02/18/18 08:00











Weight: 53.342 kg





- Exam


General: Alert, Oriented


Lungs: Clear to Auscultation, Normal Respiratory Effort


Cardiovascular: Regular Rate, Regular Rhythm


GI/Abdominal Exam: Soft, Non-Tender, No Distention


Extremities: Non-Tender, No Pedal Edema


Skin: Warm, Dry, Intact


Neurological: No: Focal Deficit





- Patient Data


Lab Results Last 24 hrs: 


 Laboratory Results - last 24 hr











  02/18/18 02/18/18 Range/Units





  06:04 06:04 


 


WBC  13.12 H   (4.0-11.0)  K/uL


 


RBC  4.17 L   (4.30-5.90)  M/uL


 


Hgb  12.3   (12.0-16.0)  g/dL


 


Hct  36.5   (36.0-46.0)  %


 


MCV  87.5   (80.0-98.0)  fL


 


MCH  29.5   (27.0-32.0)  pg


 


MCHC  33.7   (31.0-37.0)  g/dL


 


RDW Std Deviation  42.0   (28.0-62.0)  fl


 


RDW Coeff of Fatoumata  13   (11.0-15.0)  %


 


Plt Count  419 H   (150-400)  K/uL


 


MPV  11.10   (7.40-12.00)  fL


 


Neut % (Auto)  93.3 H   (48.0-80.0)  %


 


Lymph % (Auto)  5.7 L   (16.0-40.0)  %


 


Mono % (Auto)  0.8   (0.0-15.0)  %


 


Eos % (Auto)  0.0   (0.0-7.0)  %


 


Baso % (Auto)  0.2   (0.0-1.5)  %


 


Neut # (Auto)  12.3 H   (1.4-5.7)  K/uL


 


Lymph # (Auto)  0.8   (0.6-2.4)  K/uL


 


Mono # (Auto)  0.1   (0.0-0.8)  K/uL


 


Eos # (Auto)  0.0   (0.0-0.7)  K/uL


 


Baso # (Auto)  0.0   (0.0-0.1)  K/uL


 


Nucleated RBC %  0.0   /100WBC


 


Nucleated RBCs #  0   K/uL


 


Sodium   139  (136-146)  mmol/L


 


Potassium   3.5  (3.5-5.1)  mmol/L


 


Chloride   110  ()  mmol/L


 


Carbon Dioxide   20 L  (21-31)  mmol/L


 


BUN   9  (6.0-23.0)  mg/dL


 


Creatinine   0.7  (0.6-1.5)  mg/dL


 


Est Cr Clr Drug Dosing   106.01  mL/min


 


Estimated GFR (MDRD)   > 60.0  ml/min


 


Glucose   141 H  ()  mg/dL


 


Calcium   8.8  (8.8-10.8)  mg/dL


 


Total Bilirubin   0.4  (0.1-1.5)  mg/dL


 


AST   13  (5-40)  IU/L


 


ALT   10  (8-54)  IU/L


 


Alkaline Phosphatase   100  ()  


 


Total Protein   6.5  (6.0-8.0)  g/dL


 


Albumin   3.7  (3.5-5.0)  g/dL


 


Globulin   2.8  (2.0-3.5)  g/dL


 


Albumin/Globulin Ratio   1.3  (1.3-2.8)  











Result Diagrams: 


 02/18/18 06:04





 02/18/18 06:04





*Q Meaningful Use (ADM)





- VTE *Q


VTE Criteria *Q: 








- Stroke *Q


Stroke Criteria *Q: 








- AMI *Q


AMI Criteria *Q: 





Problem List Initiated/Reviewed/Updated: Yes


Orders Last 24hrs: 


 Active Orders 24 hr











 Category Date Time Status


 


 Antiembolic Devices [RC] PER UNIT ROUTINE Care  02/18/18 12:03 Ordered


 


 Intake and Output [RC] QSHIFT Care  02/18/18 12:03 Ordered


 


 Oxygen Therapy [RC] PRN Care  02/18/18 12:02 Ordered


 


 Up ad Charlene [RC] ASDIRECTED Care  02/18/18 12:02 Ordered


 


 VTE/DVT Education [RC] PER UNIT ROUTINE Care  02/18/18 12:02 Ordered


 


 Vital Signs [RC] Q4H Care  02/18/18 12:02 Ordered


 


 Clear Liquid Diet [DIET] Diet  02/18/18 Dinner Ordered


 


 Clear Liquid Diet [DIET] Diet  02/18/18 Lunch Active


 


 CBC WITH AUTO DIFF [HEME] AM Lab  02/19/18 05:11 Ordered


 


 COMPREHENSIVE METABOLIC PN,CMP [CHEM] AM Lab  02/19/18 05:11 Ordered


 


 Ciprofloxacin in D5W [Cipro in D5W 400 MG/200 ML] 400 Med  02/18/18 01:00 

Active





 mg   





 Premix Bag 1 bag   





 IV Q12H   


 


 HYDROmorphone [Dilaudid] Med  02/18/18 02:00 Active





 1 mg IVPUSH Q3H PRN   


 


 Ondansetron [Zofran] Med  02/18/18 03:00 Active





 4 mg IVPUSH Q3H PRN   


 


 Sodium Chloride 0.9% [Normal Saline] 1,000 ml Med  02/18/18 01:30 Active





 IV ASDIRECTED   


 


 methylPREDNISolone Sod Succ [Solu-MEDROL] Med  02/18/18 12:15 Ordered





 40 mg IVPUSH DAILY   


 


 metroNIDAZOLE/Normal Saline [Flagyl 500 MG in  ML Med  02/18/18 02:00 

Active





 ] 500 mg   





 Premix Bag 1 bag   





 IV Q8H   


 


 Sequential Compression Device [OM.PC] Per Unit Routine Oth  02/18/18 12:03 

Ordered


 


 Resuscitation Status Routine Resus Stat  02/18/18 12:02 Ordered








 Medication Orders





Hydromorphone HCl (Dilaudid)  1 mg IVPUSH Q3H PRN


   PRN Reason: Pain


   Last Admin: 02/18/18 08:13  Dose: 1 mg


   Admin: 02/18/18 05:42  Dose: 1 mg


Ciprofloxacin/Dextrose 400 mg/ (Premix)  200 mls @ 200 mls/hr IV Q12H [facility]


   Last Infusion: 02/18/18 02:54  Dose: 200 mls/hr


   Admin: 02/18/18 01:51  Dose: 200 mls/hr


Metronidazole 500 mg/ Premix  100 mls @ 100 mls/hr IV Q8H [facility]


   Last Admin: 02/18/18 10:17  Dose: 100 mls/hr


   Infusion: 02/18/18 04:00  Dose: 100 mls/hr


   Admin: 02/18/18 02:55  Dose: 100 mls/hr


Sodium Chloride (Normal Saline)  1,000 mls @ 125 mls/hr IV ASDIRECTED [facility]


   Last Admin: 02/18/18 01:38  Dose: 125 mls/hr


Methylprednisolone Sodium Succinate (Solu-Medrol)  40 mg IVPUSH DAILY [facility]


Ondansetron HCl (Zofran)  4 mg IVPUSH Q3H PRN


   PRN Reason: Nausea/Vomiting








Assessment/Plan Comment:: 





19 yo female admitted with Crohn's flair.  We will treat with IV fluids, 

Ciprofloxacin, Flagyl and solumedrol 40mg.

## 2018-02-19 NOTE — CT
EXAM DATE: 18



PATIENT'S AGE: 20



Patient: PERICO KEENAN



Facility: [place], [place]

Patient ID: [id number]

Site Patient ID: [id number].

Site Accession #: [id number].

: 1997

Study: CT Abdomen/Pelvis WO CONT [id number]/17/2018 10:42:47 PM

Ordering Physician: Refugio Santiago



Final Report: 

INDICATION:

Abdominal pain with nausea, vomiting and diarrhea. History of Crohn`s disease.



TECHNIQUE:

Volumetric helical scanning of the abdomen and pelvis was performed without 
contrast material. Coronal and sagittal reconstructions were obtained. 



COMPARISON:

None



FINDINGS:

There is mild to moderate circumferential wall thickening of the terminal ileum 
over a length of 15-20 cm, consistent with Crohn`s disease. No obstruction is 
evident. Some stranding in the fat surrounding the distal end of the terminal 
ileum is noted. No abscess or obvious fistula is identified. Small amount of 
free fluid is noted in the pelvis. No free air is demonstrated. 



The liver is normal in size, shape and attenuation. No bile duct dilation is 
evident. The spleen is within normal limits. The adrenal glands are 
unremarkable. The pancreas is within normal limits. The kidneys are 
unremarkable except for a 2 mm right renal collecting system stone. No 
lymphadenopathy is evident. An IUD is present in the uterus grade uterus and 
ovaries are otherwise grossly negative. 



The lung bases are clear. The heart is normal in size. 



IMPRESSION:

1. Terminal ileitis, consistent with Crohn`s disease. Some stranding around the 
distal aspect of the terminal ileum is noted, possibly due to active 
inflammation. Consider MR or CT enterography. 

2. Small moderate free fluid in the pelvis. No obvious abscess or free air.

3. 2 mm right renal collecting system stone.

4. IUD in the uterus.



Please note that all CT scans at this facility use dose modulation, iterative 
reconstruction, and/or weight-based dosing when appropriate to reduce radiation 
dose to as low as reasonably achievable.



Dictated by Maurice Conti MD @ 2018 10:54PM

(Electronic Signature)





 Top of Form 1





 Bottom of Form 1

Report Signed by Proxy.
MTDD

## 2018-02-19 NOTE — PCM.PN
- General Info


Date of Service: 02/19/18


Subjective Update: 


Patient doing better then on her admission. States that she still has abdominal 

pain secondary to her acute Crohn's disease flareup. Patient stated that she 

was able to mildly tolerated her clear liquid diet however she was nauseated 

when trying to the jello. She has been requiring Zofran every 4 hours, she also 

has Dilaudid 1 mg so far she has received 1 dose. No further vomiting, or 

diarrhea presently.








- Review of Systems


General: Reports: Weakness


HEENT: Reports: No Symptoms


Pulmonary: Reports: No Symptoms


Cardiovascular: Reports: No Symptoms


Gastrointestinal: Reports: Abdominal Pain, Nausea





- Patient Data


Vitals - Most Recent: 


 Last Vital Signs











Temp  36.6 C   02/19/18 08:00


 


Pulse  65   02/19/18 08:00


 


Resp  16   02/19/18 08:00


 


BP  122/87   02/19/18 08:00


 


Pulse Ox  100   02/19/18 08:00











Weight - Most Recent: 53.342 kg


I&O - Last 24 Hours: 


 Intake & Output











 02/18/18 02/19/18 02/19/18





 22:59 06:59 14:59


 


Intake Total 1460 800 


 


Output Total 750 1200 


 


Balance 710 -400 











Lab Results Last 24 Hours: 


 Laboratory Results - last 24 hr











  02/19/18 02/19/18 Range/Units





  05:23 05:23 


 


WBC  16.10 H   (4.0-11.0)  K/uL


 


RBC  3.83 L   (4.30-5.90)  M/uL


 


Hgb  11.2 L   (12.0-16.0)  g/dL


 


Hct  33.6 L   (36.0-46.0)  %


 


MCV  87.7   (80.0-98.0)  fL


 


MCH  29.2   (27.0-32.0)  pg


 


MCHC  33.3   (31.0-37.0)  g/dL


 


RDW Std Deviation  42.5   (28.0-62.0)  fl


 


RDW Coeff of Fatoumata  13   (11.0-15.0)  %


 


Plt Count  384   (150-400)  K/uL


 


MPV  11.20   (7.40-12.00)  fL


 


Neut % (Auto)  76.7   (48.0-80.0)  %


 


Lymph % (Auto)  15.3 L   (16.0-40.0)  %


 


Mono % (Auto)  7.9   (0.0-15.0)  %


 


Eos % (Auto)  0.0   (0.0-7.0)  %


 


Baso % (Auto)  0.1   (0.0-1.5)  %


 


Neut # (Auto)  12.4 H   (1.4-5.7)  K/uL


 


Lymph # (Auto)  2.5 H   (0.6-2.4)  K/uL


 


Mono # (Auto)  1.3 H   (0.0-0.8)  K/uL


 


Eos # (Auto)  0.0   (0.0-0.7)  K/uL


 


Baso # (Auto)  0.0   (0.0-0.1)  K/uL


 


Nucleated RBC %  0.0   /100WBC


 


Nucleated RBCs #  0   K/uL


 


Sodium   141  (136-146)  mmol/L


 


Potassium   4.0  (3.5-5.1)  mmol/L


 


Chloride   111 H  ()  mmol/L


 


Carbon Dioxide   23  (21-31)  mmol/L


 


BUN   5 L  (6.0-23.0)  mg/dL


 


Creatinine   0.6  (0.6-1.5)  mg/dL


 


Est Cr Clr Drug Dosing   123.68  mL/min


 


Estimated GFR (MDRD)   > 60.0  ml/min


 


Glucose   106  ()  mg/dL


 


Calcium   8.6 L  (8.8-10.8)  mg/dL


 


Total Bilirubin   0.3  (0.1-1.5)  mg/dL


 


AST   9  (5-40)  IU/L


 


ALT   6 L  (8-54)  IU/L


 


Alkaline Phosphatase   78  ()  


 


Total Protein   5.4 L  (6.0-8.0)  g/dL


 


Albumin   3.2 L  (3.5-5.0)  g/dL


 


Globulin   2.2  (2.0-3.5)  g/dL


 


Albumin/Globulin Ratio   1.5  (1.3-2.8)  











Med Orders - Current: 


 Current Medications





Bupropion HCl (Wellbutrin)  75 mg PO DAILY [facility]


   Last Admin: 02/18/18 12:40 Dose:  75 mg


Hydromorphone HCl (Dilaudid)  1 mg IVPUSH Q3H PRN


   PRN Reason: Pain


   Last Admin: 02/19/18 05:54 Dose:  1 mg


Ciprofloxacin/Dextrose 400 mg/ (Premix)  200 mls @ 200 mls/hr IV Q12H [facility]


   Last Infusion: 02/19/18 02:05 Dose:  Infused


Metronidazole 500 mg/ Premix  100 mls @ 100 mls/hr IV Q8H [facility]


   Last Infusion: 02/19/18 03:40 Dose:  Infused


Sodium Chloride (Normal Saline)  1,000 mls @ 125 mls/hr IV ASDIRECTED [facility]


   Last Admin: 02/19/18 07:49 Dose:  125 mls/hr


Methylprednisolone Sodium Succinate (Solu-Medrol)  40 mg IVPUSH DAILY [facility]


   Last Admin: 02/18/18 12:08 Dose:  40 mg


Ondansetron HCl (Zofran)  4 mg IVPUSH Q3H PRN


   PRN Reason: Nausea/Vomiting


   Last Admin: 02/19/18 06:02 Dose:  4 mg





Discontinued Medications





Bupropion HCl (Wellbutrin)  25 mg PO DAILY [facility]


   Last Admin: 02/18/18 12:39 Dose:  Not Given


Hydromorphone HCl (Dilaudid)  2 mg IVPUSH ONETIME ONE


   Stop: 02/17/18 23:14


   Last Admin: 02/17/18 23:20 Dose:  2 mg


Sodium Chloride (Normal Saline)  1,000 mls @ 999 mls/hr IV STAT ONE


   Stop: 02/17/18 22:38


   Last Admin: 02/17/18 21:46 Dose:  999 mls/hr


Methylprednisolone Sodium Succinate (Solu-Medrol)  125 mg IVPUSH ONETIME ONE


   Stop: 02/17/18 23:46


   Last Admin: 02/17/18 23:46 Dose:  125 mg


Methylprednisolone Sodium Succinate (Solu-Medrol) Confirm Administered Dose 125 

mg .ROUTE .STK-MED ONE


   Stop: 02/17/18 23:46


   Last Admin: 02/18/18 01:17 Dose:  Not Given


Ondansetron HCl (Zofran)  4 mg IVPUSH ONETIME ONE


   Stop: 02/17/18 23:16


   Last Admin: 02/17/18 23:20 Dose:  4 mg











- Exam


General: Alert, Oriented, Mild Distress


Lungs: Clear to Auscultation, Normal Respiratory Effort


Cardiovascular: Regular Rate, Regular Rhythm


GI/Abdominal Exam: Tender


Extremities: Normal Inspection, No Pedal Edema





- Problem List Review


Problem List Initiated/Reviewed/Updated: Yes





- Plan


Plan:: 





20 -year-old female presenting with acute Crohn's disease flareup. 





- Continue patient on IV fluids, ciprofloxacin 400 mg every 12 hours, 

metronidazole 500 mg every 8 hours, Solu-Medrol IV 40 mg every daily


- Pain control with Dilaudid 1 mg every 3 hours, Zofran 4 mg IV every 4 hours


- Progress diet as tolerated, patient presently on clear liquid diet shall 

advance the diet to soft/mechanical and see if the patient can tolerate it.


-

## 2018-02-20 NOTE — PCM.DCSUM1
**Discharge Summary





- Hospital Course


Brief History: 19 yo female with pmh of Crohn's disease who presents with four 

day history of abdominal pain and diarrhea.  Yesterday she started vomiting.  

She is on no medications for her Crohn's disease and had not had a flair for 

over a year and a half.  She reports some fevers and chills.  She denies any 

shortness of breath or cough.  CT scan of abdomen reported terminal ileitis.





- Discharge Data


Discharge Date: 02/20/18


Discharge Disposition: Home, Self-Care 01


Condition: Good





- Discharge Diagnosis/Problem(s)


(1) Abdominal pain


SNOMED Code(s): 41209970


   ICD Code: R10.9 - UNSPECIFIED ABDOMINAL PAIN   Status: Acute   


Qualifiers: 


   Abdominal location: right lower quadrant   Qualified Code(s): R10.31 - Right 

lower quadrant pain   





(2) Exacerbation of Crohn's disease


SNOMED Code(s): 40364805


   ICD Code: K50.90 - CROHN'S DISEASE, UNSPECIFIED, WITHOUT COMPLICATIONS   

Status: Acute   





(3) Terminal ileitis


SNOMED Code(s): 418276423


   ICD Code: K50.00 - CROHN'S DISEASE OF SMALL INTESTINE WITHOUT COMPLICATIONS 

  Status: Acute   





- Patient Instructions


Diet: GI Soft/Low Residue/Low Fiber


Activity: No Strenuous Activities, Rest and Relax Today


Showering/Bathing: May Shower


Notify Provider of: Fever, Increased Pain, Swelling and Redness, Drainage, 

Nausea and/or Vomiting





- Discharge Plan


Prescriptions/Med Rec: 


Ciprofloxacin HCl [Cipro] 500 mg PO BID #24 tablet


metroNIDAZOLE [Flagyl] 500 mg PO Q8H #36 tab


predniSONE [Prednisone] 10 mg PO DAILY #33 tablet


Home Medications: 


 Home Meds





buPROPion [Wellbutrin] 25 mg PO DAILY 01/23/18 [History]


Ciprofloxacin HCl [Cipro] 500 mg PO BID #24 tablet 02/20/18 [Rx]


metroNIDAZOLE [Flagyl] 500 mg PO Q8H #36 tab 02/20/18 [Rx]


predniSONE [Prednisone] 10 mg PO DAILY #33 tablet 02/20/18 [Rx]








Patient Handouts:  Prednisone tablets, Ciprofloxacin tablets, Metronidazole 

tablets or capsules, Crohn Disease


Referrals: 


[facility] [Outside]


Beth Portillo NP [Ordering Only Provider] - 02/27/18 1:15 pm





- Discharge Summary/Plan Comment


DC Time >30 min.: No


Discharge Summary/Plan Comment: 





Discharge Diagnoses;





Terminal ileitis


Crohns exacerbation


Hx Crohns disease





Deisi was admitted and treated with Ciprofloxacin, Flagyl and daily steroids. 

She was kept NPO and then slowly advanced to regular. Leukocytosis is stable 

today, likely elevated slightly due to steroids. Afebrile, continues to have 

LLQ pain and somewhat nauseated intermittently. She is demanding discharge 

today. Asking for pain medication for discharge home, she was offered Tramadol 

and she declined this saying she did not need it then. She will be discharged 

home on Cipro and Flagyl for 12 more days as well Prednisone slow taper over 

the next month. She is to take it easy on diet. She was encouraged to return to 

ED or clinic if concerns should arise. She has PCP follow up in 1 week and she 

is going to arrange follow up with her GI in [place] for when she returns 

home which will be beginning of March. 





- General Info


Date of Service: 02/20/18


Admission Dx/Problem (Free Text: 





Crohns exacerbation


Subjective Update: 





Demanding to be discharged home upon entering room. Reports she has some LLQ 

abdominal pain still,but has been without pain medication here since 0100. She 

reports pain 6/10, nausea is intermittent. She is tolerating liquids and some 

soft foods. No chest pain or SOB. 


Functional Status: Reports: Tolerating Diet, Ambulating, Urinating





- Review of Systems


General: Reports: No Symptoms


HEENT: Reports: No Symptoms


Pulmonary: Reports: No Symptoms.  Denies: Shortness of Breath


Cardiovascular: Reports: No Symptoms.  Denies: Chest Pain


Gastrointestinal: Reports: Abdominal Pain (LLQ), Flatus, Nausea.  Denies: 

Vomiting


Genitourinary: Reports: No Symptoms.  Denies: Dysuria, Frequency, Burning


Musculoskeletal: Reports: No Symptoms.  Denies: Neck Pain


Neurological: Reports: No Symptoms.  Denies: Confusion


Psychiatric: Reports: No Symptoms.  Denies: Homicidal Ideation





- Patient Data


Vitals - Most Recent: 


 Last Vital Signs











Temp  98.0 F   02/20/18 07:11


 


Pulse  78   02/20/18 07:11


 


Resp  15   02/20/18 07:11


 


BP  114/72   02/20/18 07:11


 


Pulse Ox  99   02/20/18 07:11











Weight - Most Recent: 53.342 kg


I&O - Last 24 hours: 


 Intake & Output











 02/19/18 02/20/18 02/20/18





 22:59 06:59 14:59


 


Intake Total 2191 1400 100


 


Output Total 1500 0 


 


Balance 691 1400 100











Lab Results - Last 24 hrs: 


 Laboratory Results - last 24 hr











  02/20/18 02/20/18 Range/Units





  05:11 05:11 


 


WBC  13.45 H   (4.0-11.0)  K/uL


 


RBC  3.89 L   (4.30-5.90)  M/uL


 


Hgb  11.5 L   (12.0-16.0)  g/dL


 


Hct  34.8 L   (36.0-46.0)  %


 


MCV  89.5   (80.0-98.0)  fL


 


MCH  29.6   (27.0-32.0)  pg


 


MCHC  33.0   (31.0-37.0)  g/dL


 


RDW Std Deviation  44.6   (28.0-62.0)  fl


 


RDW Coeff of Fatoumata  14   (11.0-15.0)  %


 


Plt Count  382   (150-400)  K/uL


 


MPV  11.00   (7.40-12.00)  fL


 


Neut % (Auto)  67.9   (48.0-80.0)  %


 


Lymph % (Auto)  23.8   (16.0-40.0)  %


 


Mono % (Auto)  8.1   (0.0-15.0)  %


 


Eos % (Auto)  0.1   (0.0-7.0)  %


 


Baso % (Auto)  0.1   (0.0-1.5)  %


 


Neut # (Auto)  9.1 H   (1.4-5.7)  K/uL


 


Lymph # (Auto)  3.2 H   (0.6-2.4)  K/uL


 


Mono # (Auto)  1.1 H   (0.0-0.8)  K/uL


 


Eos # (Auto)  0.0   (0.0-0.7)  K/uL


 


Baso # (Auto)  0.0   (0.0-0.1)  K/uL


 


Nucleated RBC %  0.0   /100WBC


 


Nucleated RBCs #  0   K/uL


 


Sodium   140  (136-146)  mmol/L


 


Potassium   3.7  (3.5-5.1)  mmol/L


 


Chloride   110  ()  mmol/L


 


Carbon Dioxide   25  (21-31)  mmol/L


 


BUN   5 L  (6.0-23.0)  mg/dL


 


Creatinine   0.6  (0.6-1.5)  mg/dL


 


Est Cr Clr Drug Dosing   123.68  mL/min


 


Estimated GFR (MDRD)   > 60.0  ml/min


 


Glucose   95  ()  mg/dL


 


Calcium   8.5 L  (8.8-10.8)  mg/dL


 


Total Bilirubin   0.3  (0.1-1.5)  mg/dL


 


AST   10  (5-40)  IU/L


 


ALT   6 L  (8-54)  IU/L


 


Alkaline Phosphatase   77  ()  


 


Total Protein   5.2 L  (6.0-8.0)  g/dL


 


Albumin   3.3 L  (3.5-5.0)  g/dL


 


Globulin   1.9 L  (2.0-3.5)  g/dL


 


Albumin/Globulin Ratio   1.7  (1.3-2.8)  











Med Orders - Current: 


 Current Medications





Bupropion HCl (Wellbutrin)  75 mg PO DAILY [facility]


   Last Admin: 02/20/18 09:01 Dose:  75 mg


Hydromorphone HCl (Dilaudid)  1 mg IVPUSH Q3H PRN


   PRN Reason: Pain


   Last Admin: 02/20/18 01:02 Dose:  1 mg


Ciprofloxacin/Dextrose 400 mg/ (Premix)  200 mls @ 200 mls/hr IV Q12H [facility]


   Last Admin: 02/20/18 00:25 Dose:  200 mls/hr


Metronidazole 500 mg/ Premix  100 mls @ 100 mls/hr IV Q8H [facility]


   Last Admin: 02/20/18 10:11 Dose:  100 mls/hr


Sodium Chloride (Normal Saline)  1,000 mls @ 125 mls/hr IV ASDIRECTED [facility]


   Last Admin: 02/20/18 04:43 Dose:  125 mls/hr


Methylprednisolone Sodium Succinate (Solu-Medrol)  40 mg IVPUSH DAILY [facility]


   Last Admin: 02/20/18 09:03 Dose:  40 mg


Ondansetron HCl (Zofran)  4 mg IVPUSH Q3H PRN


   PRN Reason: Nausea/Vomiting


   Last Admin: 02/20/18 01:09 Dose:  4 mg





Discontinued Medications





Bupropion HCl (Wellbutrin)  25 mg PO DAILY [facility]


   Last Admin: 02/18/18 12:39 Dose:  Not Given


Hydromorphone HCl (Dilaudid)  2 mg IVPUSH ONETIME ONE


   Stop: 02/17/18 23:14


   Last Admin: 02/17/18 23:20 Dose:  2 mg


Sodium Chloride (Normal Saline)  1,000 mls @ 999 mls/hr IV STAT ONE


   Stop: 02/17/18 22:38


   Last Admin: 02/17/18 21:46 Dose:  999 mls/hr


Methylprednisolone Sodium Succinate (Solu-Medrol)  125 mg IVPUSH ONETIME ONE


   Stop: 02/17/18 23:46


   Last Admin: 02/17/18 23:46 Dose:  125 mg


Methylprednisolone Sodium Succinate (Solu-Medrol) Confirm Administered Dose 125 

mg .ROUTE .STK-MED ONE


   Stop: 02/17/18 23:46


   Last Admin: 02/18/18 01:17 Dose:  Not Given


Ondansetron HCl (Zofran)  4 mg IVPUSH ONETIME ONE


   Stop: 02/17/18 23:16


   Last Admin: 02/17/18 23:20 Dose:  4 mg











- Exam


General: Reports: Alert, Oriented, Cooperative, No Acute Distress


Neck: Reports: Supple


Lungs: Reports: Clear to Auscultation, Normal Respiratory Effort


Cardiovascular: Reports: Regular Rate, Regular Rhythm


GI/Abdominal Exam: Normal Bowel Sounds, Soft, No Organomegaly, No Distention, 

No Mass, Tender (LLQ to deep palpation)


Back Exam: Reports: Normal Inspection, Full Range of Motion


Neurological: Reports: No New Focal Deficit


Psy/Mental Status: Reports: Alert, Normal Affect, Normal Mood





*Q Meaningful Use (DIS)





- VTE *Q


VTE Criteria *Q: 








- Stroke *Q


Stroke Criteria *Q: 








- AMI *Q


AMI Criteria *Q:
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
## 2018-05-25 NOTE — CR
EXAM DATE: 18



PATIENT'S AGE: 21



Patient: PERICO KEENAN



Facility: [place], [place]

Patient ID: [id number]

Site Patient ID: [id number].

Site Accession #: [id number].

: 1997

Study: XRay Chest [id number]/24/2018 7:02:18 PM

Ordering Physician: Doctor Temp



Final Report: 

HISTORY:

Shortness of breath and chest pain.



FINDINGS:

AP portable chest radiograph is compared with 2017. The cardiac 
silhouette is normal. Pulmonary vasculature is free of cephalization. No lobar 
consolidation, pleural effusion or pneumothorax is seen. Bony structures are 
normal for age.



IMPRESSION:

No acute cardiopulmonary disease.



Dictated by Angélica Ross MD @ 2018 7:18:27 PM



Dictated by: Angélica Ross MD @ 2018 19:18:37

(Electronic Signature)





Report Signed by Proxy.
ALEXA

## 2018-07-04 NOTE — EDM.PDOC
ED HPI GENERAL MEDICAL PROBLEM





- General


Stated Complaint: FREQUENT URINATION


Time Seen by Provider: 07/04/18 14:35


Source of Information: Reports: Patient





- History of Present Illness


INITIAL COMMENTS - FREE TEXT/NARRATIVE: 





HISTORY AND PHYSICAL:


History of present illness:


Patient presents with urinary frequency over the last few days he has frequent 

daily sexual intercourse no fever nausea vomiting chills sweats she has a 

history of Crohn's disease no abdominal pain today no distress whatsoever no 

chest pain shortness breath headache dizziness or palpitation no bowel symptoms]


Review of systems: 


As per history of present illness and below otherwise all systems reviewed and 

negative.


Past medical history: 


As per history of present illness and as reviewed below otherwise 

noncontributory.


Surgical history: 


As per history of present illness and as reviewed below otherwise 

noncontributory.


Social history: 


No reported history of drug or alcohol abuse.


Family history: 


As per history of present illness and as reviewed below otherwise 

noncontributory.


Physical exam:


HEENT: Atraumatic, normocephalic, pupils reactive, negative for conjunctival 

pallor or scleral icterus, mucous membranes moist, throat clear, neck supple, 

nontender, trachea midline.


Lungs: Clear to auscultation, breath sounds equal bilaterally, chest nontender.


Heart: S1S2, regular, negative for clicks, rubs, or JVD.


Abdomen: Soft, nondistended, nontender. Negative for masses or 

hepatosplenomegaly. Negative for costovertebral tenderness.


Pelvis: Stable nontender.


Genitourinary: Deferred.


Rectal: Deferred.


Extremities: Atraumatic, negative for cords or calf pain. Neurovascular 

unremarkable.


Neuro: Awake, alert, oriented. Cranial nerves II through XII unremarkable. 

Cerebellum unremarkable. Motor and sensory unremarkable throughout. Exam 

nonfocal.


Diagnostics:


[UA, hCG, urine culture]


Therapeutics:


[Macrobid 100 mg by mouth twice a day #14 no refill


]


Impression: 


[Urine frequency]


Definitive disposition and diagnosis as appropriate pending reevaluation and 

review of above.


  ** lower back


Pain Score (Numeric/FACES): 2





- Related Data


 Allergies











Allergy/AdvReac Type Severity Reaction Status Date / Time


 


budesonide Allergy  Numbness Verified 05/24/18 18:28


 


garlic Allergy  Shortness Verified 05/24/18 18:28





   of Breath  


 


Sulfa (Sulfonamide Allergy  Rash Verified 05/24/18 18:28





Antibiotics)     


 


pine trees Allergy  Hives Uncoded 05/24/18 18:28











Home Meds: 


 Home Meds





buPROPion [Wellbutrin] 300 mg PO DAILY 01/23/18 [History]


Anxiety Medication   07/04/18 [History]











Past Medical History





- Past Health History


Medical/Surgical History: Denies Medical/Surgical History


HEENT History: Reports: None


Cardiovascular History: Reports: None


Respiratory History: Reports: Asthma


Gastrointestinal History: Reports: Other (See Below)


Other Gastrointestinal History: chron's


Genitourinary History: Reports: None


OB/GYN History: Reports: Pregnancy


Musculoskeletal History: Reports: Back Pain, Chronic


Neurological History: Reports: None


Psychiatric History: Reports: Anxiety, Depression, OCD


Endocrine/Metabolic History: Reports: None


Hematologic History: Reports: None


Oncologic (Cancer) History: Reports: None


Dermatologic History: Reports: None





- Infectious Disease History


Infectious Disease History: Reports: None





- Past Surgical History


GI Surgical History: Reports: Colonoscopy, EGD





Social & Family History





- Family History


Family Medical History: Noncontributory


Other Dermatologic Family History: ovarian CA





- Caffeine Use


Caffeine Use: Reports: Soda





ED ROS GENERAL





- Review of Systems


Review Of Systems: See Below





ED EXAM, GENERAL





- Physical Exam


Exam: See Below





Course





- Vital Signs


Last Recorded V/S: 


 Last Vital Signs











Temp  97.9 F   07/04/18 14:48


 


Pulse  86   07/04/18 14:48


 


Resp  20   07/04/18 14:48


 


BP  114/79   07/04/18 14:48


 


Pulse Ox  99   07/04/18 14:48














- Orders/Labs/Meds


Orders: 


 Active Orders 24 hr











 Category Date Time Status


 


 CULTURE URINE [RM] Stat Lab  07/04/18 14:35 Ordered


 


 HCG QUALITATIVE,URINE [URCHEM] Stat Lab  07/04/18 14:35 Ordered


 


 UA W/MICROSCOPIC [URIN] Stat Lab  07/04/18 14:35 Ordered











Labs: 


 Laboratory Tests











  07/04/18 07/04/18 Range/Units





  14:35 14:35 


 


Urine Color  YELLOW   


 


Urine Appearance  CLEAR   


 


Urine pH  7.0   (5.0-8.0)  


 


Ur Specific Gravity  1.010   (1.001-1.035)  


 


Urine Protein  NEGATIVE   (NEGATIVE)  mg/dL


 


Urine Glucose (UA)  NEGATIVE   (NEGATIVE)  mg/dL


 


Urine Ketones  NEGATIVE   (NEGATIVE)  mg/dL


 


Urine Occult Blood  NEGATIVE   (NEGATIVE)  


 


Urine Nitrite  NEGATIVE   (NEGATIVE)  


 


Urine Bilirubin  NEGATIVE   (NEGATIVE)  


 


Urine Urobilinogen  0.2   (<2.0)  EU/dL


 


Ur Leukocyte Esterase  NEGATIVE   (NEGATIVE)  


 


Urine HCG, Qual   NEGATIVE  (NEGATIVE)  














Departure





- Departure


Time of Disposition: 15:46


Disposition: Home, Self-Care 01


Condition: Good


Clinical Impression: 


 Urinary frequency








- Discharge Information


Referrals: 


Beth Portillo NP [Primary Care Provider] - 


Additional Instructions: 


The following information is given to patients seen in the emergency department 

who are being discharged to home. This information is to outline your options 

for follow-up care. We provide all patients seen in our emergency department 

with a follow-up referral.





The need for follow-up, as well as the timing and circumstances, are variable 

depending upon the specifics of your emergency department visit.





If you don't have a primary care physician on staff, we will provide you with a 

referral. We always advise you to contact your personal physician following an 

emergency department visit to inform them of the circumstance of the visit and 

for follow-up with them and/or the need for any referrals to a consulting 

specialist.





The emergency department will also refer you to a specialist when appropriate. 

This referral assures that you have the opportunity for follow-up care with a 

specialist. All of these measure are taken in an effort to provide you with 

optimal care, which includes your follow-up.





Under all circumstances we always encourage you to contact your private 

physician who remains a resource for coordinating your care. When calling for 

follow-up care, please make the office aware that this follow-up is from your 

recent emergency room visit. If for any reason you are refused follow-up, 

please contact the [facility] emergency department at [phone] 

and asked to speak to the emergency department charge nurse.








- My Orders


Last 24 Hours: 


My Active Orders





07/04/18 14:35


CULTURE URINE [RM] Stat 


HCG QUALITATIVE,URINE [URCHEM] Stat 


UA W/MICROSCOPIC [URIN] Stat 














- Assessment/Plan


Last 24 Hours: 


My Active Orders





07/04/18 14:35


CULTURE URINE [RM] Stat 


HCG QUALITATIVE,URINE [URCHEM] Stat 


UA W/MICROSCOPIC [URIN] Stat

## 2018-10-07 NOTE — EDM.PDOC
ED HPI GENERAL MEDICAL PROBLEM





- General


Chief Complaint: Abdominal Pain


Stated Complaint: CROHN'S FLARE UP


Time Seen by Provider: 10/07/18 20:55





- History of Present Illness


INITIAL COMMENTS - FREE TEXT/NARRATIVE: 





HISTORY AND PHYSICAL:





History of present illness:


The patient is a 21-year-old female with a history of Crohn's who was last 

admitted here in February for a flareup of Crohn's and at that time was 

following with the GI physician in [place] and was on no preventative 

medication, who presents today with complaints of another Crohn's flareup. The 

patient states that after her admission in February she did follow-up with her 

doctor in [place] but now she is connecting with a provider at [facility] in [place] for more local care. She says she only gets flareups twice a 

year and is not on any preventative because of that and the GI doctor did not 

recommend any because of her minimal symptoms. She says she started feeling 

some discomfort for the last couple of days and it seemed to worsen today and 

is more on the left side. Typically she gets right-sided pain. She says she has 

known issues with her terminal ileum. She has not had fevers chills vomiting or 

diarrhea but she has had nausea. She says that her last bowel movement was 

yesterday and it was within normal limits. She says that she watches her diet 

and tries to eat more healthy foods to help prevent flareups but they are  

usually triggered by stress. Patient denies any urinary complaints has no back 

or flank pain has no chest pain or shortness of breath. She says that she never 

gets diarrhea with her flareups and her last bowel movement was not black or 

bloody.





Her prior admission of mid February was reviewed by me. She had a CT scan of 

the abdomen and pelvis were revealed terminal ileitis and she was treated with 

antibiotics and steroids. She was in the hospital for several days for pain 

nausea vomiting and then requested discharge. She was supposed to follow up 

with her provider in [place]





Review of systems: 


As per history of present illness and below otherwise all systems reviewed and 

negative.





Past medical history: 


As per history of present illness and as reviewed below otherwise 

noncontributory.





Surgical history: 


As per history of present illness and as reviewed below otherwise 

noncontributory.





Social history: 


No reported history of drug or alcohol abuse.





Family history: 


As per history of present illness and as reviewed below otherwise 

noncontributory.





Physical exam:


General: Well-developed well-nourished thin female who is nontoxic and vital 

signs are noted by me


HEENT: Atraumatic, normocephalic, , negative for conjunctival pallor or scleral 

icterus, mucous membranes moist, throat clear, neck supple, nontender, trachea 

midline.


Lungs: Clear to auscultation, breath sounds equal bilaterally, chest nontender.


Heart: S1S2, regular rate and rhythm no overt murmur


Abdomen: Soft, nondistended, bowel sounds are hypoactive. There is tenderness 

to the right and left of the umbilicus with out rebound or guarding and there 

is more tenderness on the left side. It is very mild in character. Negative for 

masses or hepatosplenomegaly. 


Pelvis: Deferred


Genitourinary: Deferred.


Rectal: Deferred.


Extremities: Atraumatic, negative for cords or calf pain. Neurovascular 

unremarkable.


Neuro: Awake, alert, oriented. Cranial nerves II through XII unremarkable. 

Cerebellum unremarkable. Motor and sensory unremarkable throughout. Exam 

nonfocal.





Diagnostics:


CBC CMP amylase lipase CRP UA UCG





Therapeutics:


IV fluids Zofran Toradol Dilaudid Solu-Medrol


Please note that the patient refused the Toradol due to her Crohn's and she was 

told not to take NSAIDs





I discussed with the patient we would see her clinical course here and evaluate 

her lab tests more specifically the CRP to decide if imaging as indicated. He 

is agreeable to see how her course goes and then determine that





Patient says that her pain is completely gone and I discussed with her testing 

results and we will hold off imaging at this time. I've advised her to contact 

her provider Jane Portillo at [facility] tomorrow and I will give her 1 

dose of solu-Medrol 40 mg and have told her that I cannot write a prescription 

for home as I cannot follow this with her. She is comfortable with this. I will 

also give her some Bentyl/dicyclomine from [facility] that she can try. She is 

aware of need for follow-up.





Impression: 


Abdominal pain/Crohn's flareup





Definitive disposition and diagnosis as appropriate pending reevaluation and 

review of above.


  ** Abdominal


Pain Score (Numeric/FACES): 4





- Related Data


 Allergies











Allergy/AdvReac Type Severity Reaction Status Date / Time


 


budesonide Allergy  Numbness Verified 10/07/18 21:01


 


garlic Allergy  Shortness Verified 10/07/18 21:01





   of Breath  


 


Sulfa (Sulfonamide Allergy  Rash Verified 10/07/18 21:01





Antibiotics)     


 


pine trees Allergy  Hives Uncoded 10/07/18 21:01











Home Meds: 


 Home Meds





buPROPion [Wellbutrin] 300 mg PO DAILY 01/23/18 [History]











Past Medical History





- Past Health History


Medical/Surgical History: Denies Medical/Surgical History


HEENT History: Reports: None


Cardiovascular History: Reports: None


Respiratory History: Reports: Asthma


Gastrointestinal History: Reports: Other (See Below)


Other Gastrointestinal History: chron's


Genitourinary History: Reports: None


OB/GYN History: Reports: Pregnancy


Musculoskeletal History: Reports: Back Pain, Chronic


Neurological History: Reports: None


Psychiatric History: Reports: Anxiety, Depression, OCD


Endocrine/Metabolic History: Reports: None


Hematologic History: Reports: None


Oncologic (Cancer) History: Reports: None


Dermatologic History: Reports: None





- Infectious Disease History


Infectious Disease History: Reports: None





- Past Surgical History


GI Surgical History: Reports: Colonoscopy, EGD





Social & Family History





- Family History


Family Medical History: Noncontributory


Other Dermatologic Family History: ovarian CA





- Caffeine Use


Caffeine Use: Reports: Soda





ED ROS GENERAL





- Review of Systems


Review Of Systems: ROS reveals no pertinent complaints other than HPI.





ED EXAM, GENERAL





- Physical Exam


Exam: See Below (See dictation)





Course





- Vital Signs


Last Recorded V/S: 


 Last Vital Signs











Temp  36.7 C   10/07/18 21:00


 


Pulse  96   10/07/18 21:00


 


Resp  16   10/07/18 21:00


 


BP  122/84   10/07/18 21:00


 


Pulse Ox  98   10/07/18 21:00














- Orders/Labs/Meds


Orders: 


 Active Orders 24 hr











 Category Date Time Status


 


 Sodium Chloride 0.9% [Normal Saline] 1,000 ml Med  10/07/18 21:11 Active





 IV STAT   


 


 Sodium Chloride 0.9% [Saline Flush] Med  10/07/18 21:11 Active





 10 ml FLUSH ASDIRECTED PRN   


 


 Sodium Chloride 0.9% [Saline Flush] Med  10/07/18 21:11 Active





 2.5 ml FLUSH ASDIRECTED PRN   


 


 Saline Lock Insert [OM.PC] Stat Oth  10/07/18 21:10 Ordered








 Medication Orders





Sodium Chloride (Normal Saline)  1,000 mls @ 999 mls/hr IV STAT ONE


   Stop: 10/07/18 22:11


   Last Admin: 10/07/18 21:24  Dose: 999 mls/hr


Sodium Chloride (Saline Flush)  10 ml FLUSH ASDIRECTED PRN


   PRN Reason: Keep Vein Open


   Last Admin: 10/07/18 21:24  Dose: 10 ml


Sodium Chloride (Saline Flush)  2.5 ml FLUSH ASDIRECTED PRN


   PRN Reason: Keep Vein Open


   Last Admin: 10/07/18 21:24  Dose: 2.5 ml








Labs: 


 Laboratory Tests











  10/07/18 10/07/18 10/07/18 Range/Units





  21:00 21:00 21:16 


 


WBC    11.20 H  (4.0-11.0)  K/uL


 


RBC    4.26 L  (4.30-5.90)  M/uL


 


Hgb    13.4  (12.0-16.0)  g/dL


 


Hct    38.9  (36.0-46.0)  %


 


MCV    91.3  (80.0-98.0)  fL


 


MCH    31.5  (27.0-32.0)  pg


 


MCHC    34.4  (31.0-37.0)  g/dL


 


RDW Std Deviation    42.1  (28.0-62.0)  fl


 


RDW Coeff of Fatoumata    13  (11.0-15.0)  %


 


Plt Count    344  (150-400)  K/uL


 


MPV    11.40  (7.40-12.00)  fL


 


Neut % (Auto)    75.0  (48.0-80.0)  %


 


Lymph % (Auto)    18.9  (16.0-40.0)  %


 


Mono % (Auto)    5.9  (0.0-15.0)  %


 


Eos % (Auto)    0.0  (0.0-7.0)  %


 


Baso % (Auto)    0.2  (0.0-1.5)  %


 


Neut # (Auto)    8.4 H  (1.4-5.7)  K/uL


 


Lymph # (Auto)    2.1  (0.6-2.4)  K/uL


 


Mono # (Auto)    0.7  (0.0-0.8)  K/uL


 


Eos # (Auto)    0.0  (0.0-0.7)  K/uL


 


Baso # (Auto)    0.0  (0.0-0.1)  K/uL


 


Nucleated RBC %    0.0  /100WBC


 


Nucleated RBCs #    0  K/uL


 


Sodium     (136-145)  mmol/L


 


Potassium     (3.5-5.1)  mmol/L


 


Chloride     ()  mmol/L


 


Carbon Dioxide     (21.0-32.0)  mmol/L


 


BUN     (7.0-18.0)  mg/dL


 


Creatinine     (0.6-1.0)  mg/dL


 


Est Cr Clr Drug Dosing     mL/min


 


Estimated GFR (MDRD)     ml/min


 


Glucose     ()  mg/dL


 


Calcium     (8.5-10.1)  mg/dL


 


Total Bilirubin     (0.2-1.0)  mg/dL


 


AST     (15-37)  IU/L


 


ALT     (14-63)  IU/L


 


Alkaline Phosphatase     ()  U/L


 


C-Reactive Protein     (0.00-0.90)  mg/dL


 


Total Protein     (6.4-8.2)  g/dL


 


Albumin     (3.4-5.0)  g/dL


 


Globulin     (2.0-3.5)  g/dL


 


Albumin/Globulin Ratio     (1.3-2.8)  


 


Amylase     ()  U/L


 


Lipase     ()  U/L


 


Urine Color  YELLOW    


 


Urine Appearance  SLT CLOUDY    


 


Urine pH  7.5    (5.0-8.0)  


 


Ur Specific Gravity  1.015    (1.001-1.035)  


 


Urine Protein  NEGATIVE    (NEGATIVE)  mg/dL


 


Urine Glucose (UA)  NEGATIVE    (NEGATIVE)  mg/dL


 


Urine Ketones  NEGATIVE    (NEGATIVE)  mg/dL


 


Urine Occult Blood  LARGE H    (NEGATIVE)  


 


Urine Nitrite  NEGATIVE    (NEGATIVE)  


 


Urine Bilirubin  NEGATIVE    (NEGATIVE)  


 


Urine Urobilinogen  0.2    (<2.0)  EU/dL


 


Ur Leukocyte Esterase  TRACE    (NEGATIVE)  


 


Urine RBC  0-3    (0-2/HPF)  


 


Urine WBC  0-3    (0-5/HPF)  


 


Ur Epithelial Cells  FEW    (NONE-FEW)  


 


Urine Bacteria  FEW    (NEGATIVE)  


 


Urine HCG, Qual   NEGATIVE   (NEGATIVE)  














  10/07/18 Range/Units





  21:16 


 


WBC   (4.0-11.0)  K/uL


 


RBC   (4.30-5.90)  M/uL


 


Hgb   (12.0-16.0)  g/dL


 


Hct   (36.0-46.0)  %


 


MCV   (80.0-98.0)  fL


 


MCH   (27.0-32.0)  pg


 


MCHC   (31.0-37.0)  g/dL


 


RDW Std Deviation   (28.0-62.0)  fl


 


RDW Coeff of Fatoumata   (11.0-15.0)  %


 


Plt Count   (150-400)  K/uL


 


MPV   (7.40-12.00)  fL


 


Neut % (Auto)   (48.0-80.0)  %


 


Lymph % (Auto)   (16.0-40.0)  %


 


Mono % (Auto)   (0.0-15.0)  %


 


Eos % (Auto)   (0.0-7.0)  %


 


Baso % (Auto)   (0.0-1.5)  %


 


Neut # (Auto)   (1.4-5.7)  K/uL


 


Lymph # (Auto)   (0.6-2.4)  K/uL


 


Mono # (Auto)   (0.0-0.8)  K/uL


 


Eos # (Auto)   (0.0-0.7)  K/uL


 


Baso # (Auto)   (0.0-0.1)  K/uL


 


Nucleated RBC %   /100WBC


 


Nucleated RBCs #   K/uL


 


Sodium  143  (136-145)  mmol/L


 


Potassium  3.7  (3.5-5.1)  mmol/L


 


Chloride  105  ()  mmol/L


 


Carbon Dioxide  26.9  (21.0-32.0)  mmol/L


 


BUN  6 L  (7.0-18.0)  mg/dL


 


Creatinine  0.8  (0.6-1.0)  mg/dL


 


Est Cr Clr Drug Dosing  95.59  mL/min


 


Estimated GFR (MDRD)  > 60.0  ml/min


 


Glucose  113 H  ()  mg/dL


 


Calcium  9.1  (8.5-10.1)  mg/dL


 


Total Bilirubin  0.4  (0.2-1.0)  mg/dL


 


AST  11 L  (15-37)  IU/L


 


ALT  18  (14-63)  IU/L


 


Alkaline Phosphatase  98  ()  U/L


 


C-Reactive Protein  0.10  (0.00-0.90)  mg/dL


 


Total Protein  7.9  (6.4-8.2)  g/dL


 


Albumin  4.3  (3.4-5.0)  g/dL


 


Globulin  3.6 H  (2.0-3.5)  g/dL


 


Albumin/Globulin Ratio  1.2 L  (1.3-2.8)  


 


Amylase  42  ()  U/L


 


Lipase  136  ()  U/L


 


Urine Color   


 


Urine Appearance   


 


Urine pH   (5.0-8.0)  


 


Ur Specific Gravity   (1.001-1.035)  


 


Urine Protein   (NEGATIVE)  mg/dL


 


Urine Glucose (UA)   (NEGATIVE)  mg/dL


 


Urine Ketones   (NEGATIVE)  mg/dL


 


Urine Occult Blood   (NEGATIVE)  


 


Urine Nitrite   (NEGATIVE)  


 


Urine Bilirubin   (NEGATIVE)  


 


Urine Urobilinogen   (<2.0)  EU/dL


 


Ur Leukocyte Esterase   (NEGATIVE)  


 


Urine RBC   (0-2/HPF)  


 


Urine WBC   (0-5/HPF)  


 


Ur Epithelial Cells   (NONE-FEW)  


 


Urine Bacteria   (NEGATIVE)  


 


Urine HCG, Qual   (NEGATIVE)  











Meds: 


Medications











Generic Name Dose Route Start Last Admin





  Trade Name Casey  PRN Reason Stop Dose Admin


 


Sodium Chloride  1,000 mls @ 999 mls/hr  10/07/18 21:11  10/07/18 21:24





  Normal Saline  IV  10/07/18 22:11  999 mls/hr





  STAT ONE   Administration





     





     





     





     


 


Sodium Chloride  10 ml  10/07/18 21:11  10/07/18 21:24





  Saline Flush  FLUSH   10 ml





  ASDIRECTED PRN   Administration





  Keep Vein Open   





     





     





     


 


Sodium Chloride  2.5 ml  10/07/18 21:11  10/07/18 21:24





  Saline Flush  FLUSH   2.5 ml





  ASDIRECTED PRN   Administration





  Keep Vein Open   





     





     





     














Discontinued Medications














Generic Name Dose Route Start Last Admin





  Trade Name Casey  PRN Reason Stop Dose Admin


 


Hydromorphone HCl  1 mg  10/07/18 21:11  10/07/18 21:24





  Dilaudid  IVPUSH  10/07/18 21:12  1 mg





  ONETIME ONE   Administration





     





     





     





     


 


Ketorolac Tromethamine  30 mg  10/07/18 21:11  10/07/18 21:32





  Toradol  IVPUSH  10/07/18 21:12  Not Given





  ONETIME ONE   





     





     





     





     


 


Ondansetron HCl  4 mg  10/07/18 21:11  10/07/18 21:24





  Zofran  IVPUSH  10/07/18 21:12  4 mg





  ONETIME ONE   Administration





     





     





     





     














Departure





- Departure


Time of Disposition: 22:08


Disposition: Home, Self-Care 01


Condition: Good


Clinical Impression: 


Exacerbation of Crohn's disease


Qualifiers:


 Digestive disease complication type: without complication Qualified Code(s): 

K50.90 - Crohn's disease, unspecified, without complications








- Discharge Information


Referrals: 


Beth Portillo NP [Primary Care Provider] - 


Forms:  ED Department Discharge


Additional Instructions: 


The following information is given to patients seen in the emergency department 

who are being discharged to home. This information is to outline your options 

for follow-up care. We provide all patients seen in our emergency department 

with a follow-up referral.





The need for follow-up, as well as the timing and circumstances, are variable 

depending upon the specifics of your emergency department visit.





If you don't have a primary care physician on staff, we will provide you with a 

referral. We always advise you to contact your personal physician following an 

emergency department visit to inform them of the circumstance of the visit and 

for follow-up with them and/or the need for any referrals to a consulting 

specialist.





The emergency department will also refer you to a specialist when appropriate. 

This referral assures that you have the opportunity for followup care with a 

specialist. All of these measure are taken in an effort to provide you with 

optimal care, which includes your followup.





Under all circumstances we always encourage you to contact your private 

physician who remains a resource for coordinating  your care. When calling for 

followup care, please make the office aware that this follow-up is from your 

recent emergency room visit. If for any reason you are refused follow-up, 

please contact the [facility] emergency 

department at [phone] and ask to speak to the emergency department 

charge nurse.





[address]


[place] [address]


[phone]








Push hydration and rest. Use Zofran that you have at home as needed for nausea 

and vomiting and try the dicyclomine/Bentyl you have been given this evening. 

Please contact her provider Jane Portillo at [facility] in the morning to 

schedule follow-up and further care. Return to ER as needed and as discussed





- My Orders


Last 24 Hours: 


My Active Orders





10/07/18 21:10


Saline Lock Insert [OM.PC] Stat 





10/07/18 21:11


Sodium Chloride 0.9% [Normal Saline] 1,000 ml IV STAT 


Sodium Chloride 0.9% [Saline Flush]   10 ml FLUSH ASDIRECTED PRN 


Sodium Chloride 0.9% [Saline Flush]   2.5 ml FLUSH ASDIRECTED PRN 














- Assessment/Plan


Last 24 Hours: 


My Active Orders





10/07/18 21:10


Saline Lock Insert [OM.PC] Stat 





10/07/18 21:11


Sodium Chloride 0.9% [Normal Saline] 1,000 ml IV STAT 


Sodium Chloride 0.9% [Saline Flush]   10 ml FLUSH ASDIRECTED PRN 


Sodium Chloride 0.9% [Saline Flush]   2.5 ml FLUSH ASDIRECTED PRN